# Patient Record
Sex: FEMALE | Race: WHITE | Employment: FULL TIME | URBAN - METROPOLITAN AREA
[De-identification: names, ages, dates, MRNs, and addresses within clinical notes are randomized per-mention and may not be internally consistent; named-entity substitution may affect disease eponyms.]

---

## 2017-09-07 ENCOUNTER — HOSPITAL ENCOUNTER (OUTPATIENT)
Dept: LAB | Age: 27
Discharge: HOME | End: 2017-09-07
Payer: COMMERCIAL

## 2017-09-07 PROCEDURE — 88175 CYTOPATH C/V AUTO FLUID REDO: CPT | Performed by: ADVANCED PRACTICE MIDWIFE

## 2018-11-21 ENCOUNTER — HOSPITAL ENCOUNTER (OUTPATIENT)
Dept: LAB | Age: 28
Discharge: HOME | End: 2018-11-21
Payer: COMMERCIAL

## 2018-11-21 PROCEDURE — 88175 CYTOPATH C/V AUTO FLUID REDO: CPT

## 2018-11-21 PROCEDURE — 87624 HPV HI-RISK TYP POOLED RSLT: CPT

## 2019-02-15 LAB
CHLAMYDIA, EXTERNAL: NORMAL
HBSAG, EXTERNAL: NEGATIVE
HEPATITIS C AB,   EXT: NEGATIVE
HIV, EXTERNAL: NORMAL
N. GONORRHEA, EXTERNAL: NEGATIVE
RPR, EXTERNAL: NEGATIVE
RUBELLA, EXTERNAL: NORMAL

## 2019-08-28 LAB — GRBS, EXTERNAL: NEGATIVE

## 2019-09-09 ENCOUNTER — HOSPITAL ENCOUNTER (INPATIENT)
Age: 29
LOS: 2 days | Discharge: HOME | End: 2019-09-11
Attending: OBSTETRICS & GYNECOLOGY | Admitting: OBSTETRICS & GYNECOLOGY
Payer: COMMERCIAL

## 2019-09-09 PROBLEM — D68.51 FACTOR V LEIDEN (HCC): Status: ACTIVE | Noted: 2019-09-09

## 2019-09-09 LAB
ABO + RH BLD: NORMAL
BASOPHILS # BLD: 0 K/UL (ref 0–0.1)
BASOPHILS NFR BLD: 0 % (ref 0–2)
BLOOD GROUP ANTIBODIES SERPL: NORMAL
DIFFERENTIAL METHOD BLD: ABNORMAL
EOSINOPHIL # BLD: 0 K/UL (ref 0–0.4)
EOSINOPHIL NFR BLD: 0 % (ref 0–5)
ERYTHROCYTE [DISTWIDTH] IN BLOOD BY AUTOMATED COUNT: 12.9 % (ref 11.6–14.5)
HCT VFR BLD AUTO: 40.6 % (ref 35–45)
HGB BLD-MCNC: 13.9 G/DL (ref 12–16)
LYMPHOCYTES # BLD: 1.7 K/UL (ref 0.9–3.6)
LYMPHOCYTES NFR BLD: 9 % (ref 21–52)
MCH RBC QN AUTO: 32.2 PG (ref 24–34)
MCHC RBC AUTO-ENTMCNC: 34.2 G/DL (ref 31–37)
MCV RBC AUTO: 94 FL (ref 74–97)
MONOCYTES # BLD: 1.2 K/UL (ref 0.05–1.2)
MONOCYTES NFR BLD: 6 % (ref 3–10)
NEUTS SEG # BLD: 16.1 K/UL (ref 1.8–8)
NEUTS SEG NFR BLD: 85 % (ref 40–73)
PLATELET # BLD AUTO: 231 K/UL (ref 135–420)
PMV BLD AUTO: 9.5 FL (ref 9.2–11.8)
RBC # BLD AUTO: 4.32 M/UL (ref 4.2–5.3)
SPECIMEN EXP DATE BLD: NORMAL
WBC # BLD AUTO: 19 K/UL (ref 4.6–13.2)

## 2019-09-09 PROCEDURE — 86900 BLOOD TYPING SEROLOGIC ABO: CPT

## 2019-09-09 PROCEDURE — 4A1HXCZ MONITORING OF PRODUCTS OF CONCEPTION, CARDIAC RATE, EXTERNAL APPROACH: ICD-10-PCS | Performed by: OBSTETRICS & GYNECOLOGY

## 2019-09-09 PROCEDURE — 75410000003 HC RECOV DEL/VAG/CSECN EA 0.5 HR

## 2019-09-09 PROCEDURE — 75410000002 HC LABOR FEE PER 1 HR

## 2019-09-09 PROCEDURE — 75410000000 HC DELIVERY VAGINAL/SINGLE

## 2019-09-09 PROCEDURE — 65270000029 HC RM PRIVATE

## 2019-09-09 PROCEDURE — 59025 FETAL NON-STRESS TEST: CPT

## 2019-09-09 PROCEDURE — 99282 EMERGENCY DEPT VISIT SF MDM: CPT

## 2019-09-09 PROCEDURE — 74011250637 HC RX REV CODE- 250/637: Performed by: ADVANCED PRACTICE MIDWIFE

## 2019-09-09 PROCEDURE — 10907ZC DRAINAGE OF AMNIOTIC FLUID, THERAPEUTIC FROM PRODUCTS OF CONCEPTION, VIA NATURAL OR ARTIFICIAL OPENING: ICD-10-PCS | Performed by: OBSTETRICS & GYNECOLOGY

## 2019-09-09 PROCEDURE — 85025 COMPLETE CBC W/AUTO DIFF WBC: CPT

## 2019-09-09 RX ORDER — ZOLPIDEM TARTRATE 5 MG/1
5 TABLET ORAL
Status: DISCONTINUED | OUTPATIENT
Start: 2019-09-09 | End: 2019-09-11 | Stop reason: HOSPADM

## 2019-09-09 RX ORDER — METHYLERGONOVINE MALEATE 0.2 MG/ML
0.2 INJECTION INTRAVENOUS AS NEEDED
Status: DISCONTINUED | OUTPATIENT
Start: 2019-09-09 | End: 2019-09-09

## 2019-09-09 RX ORDER — OXYTOCIN/RINGER'S LACTATE 20/1000 ML
999 PLASTIC BAG, INJECTION (ML) INTRAVENOUS ONCE
Status: DISCONTINUED | OUTPATIENT
Start: 2019-09-09 | End: 2019-09-09

## 2019-09-09 RX ORDER — HYDROMORPHONE HYDROCHLORIDE 1 MG/ML
1 INJECTION, SOLUTION INTRAMUSCULAR; INTRAVENOUS; SUBCUTANEOUS
Status: DISCONTINUED | OUTPATIENT
Start: 2019-09-09 | End: 2019-09-09

## 2019-09-09 RX ORDER — ACETAMINOPHEN 325 MG/1
650 TABLET ORAL
Status: DISCONTINUED | OUTPATIENT
Start: 2019-09-09 | End: 2019-09-11 | Stop reason: HOSPADM

## 2019-09-09 RX ORDER — LIDOCAINE HYDROCHLORIDE 10 MG/ML
20 INJECTION, SOLUTION EPIDURAL; INFILTRATION; INTRACAUDAL; PERINEURAL AS NEEDED
Status: DISCONTINUED | OUTPATIENT
Start: 2019-09-09 | End: 2019-09-09

## 2019-09-09 RX ORDER — AMOXICILLIN 250 MG
1 CAPSULE ORAL
Status: DISCONTINUED | OUTPATIENT
Start: 2019-09-09 | End: 2019-09-11 | Stop reason: HOSPADM

## 2019-09-09 RX ORDER — TERBUTALINE SULFATE 1 MG/ML
0.25 INJECTION SUBCUTANEOUS
Status: DISCONTINUED | OUTPATIENT
Start: 2019-09-09 | End: 2019-09-09

## 2019-09-09 RX ORDER — BUTORPHANOL TARTRATE 1 MG/ML
2 INJECTION INTRAMUSCULAR; INTRAVENOUS
Status: DISCONTINUED | OUTPATIENT
Start: 2019-09-09 | End: 2019-09-09

## 2019-09-09 RX ORDER — MISOPROSTOL 200 UG/1
800 TABLET ORAL
Status: DISCONTINUED | OUTPATIENT
Start: 2019-09-09 | End: 2019-09-09

## 2019-09-09 RX ORDER — SODIUM CHLORIDE, SODIUM LACTATE, POTASSIUM CHLORIDE, CALCIUM CHLORIDE 600; 310; 30; 20 MG/100ML; MG/100ML; MG/100ML; MG/100ML
125 INJECTION, SOLUTION INTRAVENOUS CONTINUOUS
Status: DISCONTINUED | OUTPATIENT
Start: 2019-09-09 | End: 2019-09-09

## 2019-09-09 RX ORDER — SALICYLIC ACID
90 POWDER (GRAM) MISCELLANEOUS ONCE
Status: DISCONTINUED | OUTPATIENT
Start: 2019-09-09 | End: 2019-09-09

## 2019-09-09 RX ORDER — NALBUPHINE HYDROCHLORIDE 10 MG/ML
10 INJECTION, SOLUTION INTRAMUSCULAR; INTRAVENOUS; SUBCUTANEOUS
Status: DISCONTINUED | OUTPATIENT
Start: 2019-09-09 | End: 2019-09-09

## 2019-09-09 RX ORDER — IBUPROFEN 400 MG/1
800 TABLET ORAL
Status: DISCONTINUED | OUTPATIENT
Start: 2019-09-09 | End: 2019-09-11 | Stop reason: HOSPADM

## 2019-09-09 RX ORDER — SALICYLIC ACID
90 POWDER (GRAM) MISCELLANEOUS ONCE
Status: COMPLETED | OUTPATIENT
Start: 2019-09-09 | End: 2019-09-09

## 2019-09-09 RX ORDER — OXYTOCIN/RINGER'S LACTATE 20/1000 ML
125 PLASTIC BAG, INJECTION (ML) INTRAVENOUS CONTINUOUS
Status: DISCONTINUED | OUTPATIENT
Start: 2019-09-09 | End: 2019-09-09

## 2019-09-09 RX ORDER — HYDROCORTISONE 25 MG/G
CREAM TOPICAL AS NEEDED
Status: DISCONTINUED | OUTPATIENT
Start: 2019-09-09 | End: 2019-09-11 | Stop reason: HOSPADM

## 2019-09-09 RX ORDER — PROMETHAZINE HYDROCHLORIDE 25 MG/ML
25 INJECTION, SOLUTION INTRAMUSCULAR; INTRAVENOUS
Status: DISCONTINUED | OUTPATIENT
Start: 2019-09-09 | End: 2019-09-11 | Stop reason: HOSPADM

## 2019-09-09 RX ORDER — ENOXAPARIN SODIUM 100 MG/ML
40 INJECTION SUBCUTANEOUS EVERY 24 HOURS
Status: DISCONTINUED | OUTPATIENT
Start: 2019-09-10 | End: 2019-09-11 | Stop reason: HOSPADM

## 2019-09-09 RX ORDER — OXYTOCIN 10 [USP'U]/ML
INJECTION, SOLUTION INTRAMUSCULAR; INTRAVENOUS
Status: DISCONTINUED
Start: 2019-09-09 | End: 2019-09-09 | Stop reason: WASHOUT

## 2019-09-09 RX ADMIN — IBUPROFEN 800 MG: 400 TABLET, FILM COATED ORAL at 20:57

## 2019-09-09 RX ADMIN — CASTOR OIL 60 ML: 1 LIQUID ORAL at 19:10

## 2019-09-09 NOTE — L&D DELIVERY NOTE
Delivery Summary     Blanchard Councilman had urge ot push in shower. Assisted to bed. SVE was 9cms with BBOW. Consented to AROM- clear fluid; 5 mins later was 10 cms and pushed well   of VFi over small 1st degree lac that was hemostatic. No repair. Baby to mom's chest with spont cry and pink color. apgar 8/9. Cord double clamped , then cut by dad after placental transfusion complete. Placenta delivered spont intact with 3 CV, EBl 250cc. Dr Mary Moreland  informed of status. Elsa        Patient: Luiz Cage MRN: [id number]  SSN: [id number]    YOB: 1990  Age: 30 y.o. Sex: female       Information for the patient's :  Gregory Roach [[id number]]       Labor Events:    Labor: No    Steroids: None   Cervical Ripening Date/Time:       Cervical Ripening Type: None   Antibiotics During Labor: No   Rupture Identifier:      Rupture Date/Time: 2019 6:50 PM   Rupture Type: AROM   Amniotic Fluid Volume:  Moderate    Amniotic Fluid Description: Clear    Amniotic Fluid Odor:      Induction: None       Induction Date/Time:        Indications for Induction:      Augmentation: AROM   Augmentation Date/Time: 20196:50 PM   Indications for Augmentation:     Labor complications: None       Additional complications:        Delivery Events:  Indications For Episiotomy:     Episiotomy: None   Perineal Laceration(s): None   Repaired:     Periurethral Laceration Location:      Repaired:     Labial Laceration Location:     Repaired:     Sulcal Laceration Location:     Repaired:     Vaginal Laceration Location:     Repaired:     Cervical Laceration Location:     Repaired:     Repair Suture: None   Number of Repair Packets:     Estimated Blood Loss (ml): 250ml     Delivery Date: 2019    Delivery Time: 7:03 PM  Delivery Type: Vaginal, Spontaneous  Sex:  Female    Gestational Age: 36w4d   Delivery Clinician:  Sridhar Marinelli  Living Status: Living   Delivery Location: L&D 3418          APGARS  One minute Five minutes Ten minutes   Skin color:            Heart rate:            Grimace:            Muscle tone:            Breathing: Totals:                Presentation: Vertex    Position:        Resuscitation Method:        Meconium Stained:        Cord Information: 3 Vessels  Complications: None  Cord around:    Delayed cord clamping? Yes  Cord clamped date/time:2019  7:12 PM  Disposition of Cord Blood: Discard    Blood Gases Sent?: No    Placenta:  Date/Time: 2019  7:12 PM  Removal: Spontaneous      Appearance: Normal;Intact     [place] Measurements:  Birth Weight:        Birth Length:        Head Circumference:        Chest Circumference:       Abdominal Girth: Other Providers:   TICO Lockwood;;;;;;;Jose PAUL, Obstetrician;Primary Nurse;Primary [place] Nurse;Nicu Nurse;Neonatologist;Anesthesiologist;Crna;Nurse Practitioner;Midwife;Nursery Nurse           Group B Strep:   Lab Results   Component Value Date/Time    GrBStrep, External Negative 2019     Information for the patient's :  BG Jamir Richter [[id number]]   No results found for: ABORH, PCTABR, PCTDIG, BILI, ABORHEXT, ABORH    No results for input(s): PCO2CB, PO2CB, HCO3I, SO2I, IBD, PTEMPI, SPECTI, PHICB, ISITE, IDEV, IALLEN in the last 72 hours.

## 2019-09-09 NOTE — H&P
Obstetric Admission History and Physical    Name: Octavia Casas MRN: [id number] [id number]: [id number]    YOB: 1990  Age: 30 y.o. Sex: female       Subjective:      Chief complaint:    Chief Complaint   Patient presents with   Olvinmark Davidson is a 30 y.o.  female, G 2 P 1 who presents at 38.2 weeks gestation with c/o contractions since 1600 today. On admission EFM strip is obtained and is Category 1.     OB HISTORY  Prenatal care started at 8 wks with [address]. TVS supporting dates and viability. Problems of pregnancy include heterozygous factor V leiden, no PE, on baby ASA, only needs anticoags for PP. Total wt gain 29 lbs. GBS neg. PAST PREGNANCY HISTORY   39.2 12 hrsm 6-12 oz, f  none [facility]    PAST MEDICAL / SURGICAL HISTORY  No past medical history on file.   Problem List as of 2019 Date Reviewed: 2019          Codes Class Noted - Resolved    Factor V Leiden [facility]) ICD-10-CM: M46.48  ICD-9-CM: 289.81  2019 - Present        * (Principal) Labor and delivery indication for care or intervention ICD-10-CM: O75.9  ICD-9-CM: 659.90  2019 - Present        RESOLVED: Labor and delivery, indication for care ICD-10-CM: O75.9  ICD-9-CM: 659.90  2016 - 2019              FAMILY/ SOCIAL HISTORY  Social History     Socioeconomic History    Marital status:      Spouse name: Not on file    Number of children: Not on file    Years of education: Not on file    Highest education level: Not on file   Occupational History    Not on file   Social Needs    Financial resource strain: Not on file    Food insecurity:     Worry: Not on file     Inability: Not on file    Transportation needs:     Medical: Not on file     Non-medical: Not on file   Tobacco Use    Smoking status: Not on file   Substance and Sexual Activity    Alcohol use: Not on file    Drug use: Not on file    Sexual activity: Not on file   Lifestyle    Physical activity:     Days per week: Not on file     Minutes per session: Not on file    Stress: Not on file   Relationships    Social connections:     Talks on phone: Not on file     Gets together: Not on file     Attends Jain service: Not on file     Active member of club or organization: Not on file     Attends meetings of clubs or organizations: Not on file     Relationship status: Not on file    Intimate partner violence:     Fear of current or ex partner: Not on file     Emotionally abused: Not on file     Physically abused: Not on file     Forced sexual activity: Not on file   Other Topics Concern    Not on file   Social History Narrative    Not on file          ALLERGY:  Allergies   Allergen Reactions    Latex Rash       Review of Systems:  A comprehensive review of systems was negative      Objective:     VITAL SIGNS:  Visit Vitals  Temp 98.9 °F (37.2 °C)   Resp 18   SpO2 100%   Breastfeeding? Yes       Physical Exam:  Abdomen: soft  Position of baby vtx  Estimated fetal weight 7 lbs   Contractions q 4 mins/mod quality  FHR baseline at  150 bpm/ variability mod/Category 1/accels  External Genitalia: normal general appearance without lesions  Cervix: Dilation: 6cm/90/-1  Membranes  intact     Assessment:     1) 38.2 weeks Intrauterine Pregnancy . 2) labor management      Plan:     Reassuring fetal status, Labor  Progressing normally, Continue plan for vaginal delivery   desires unmed birth. Reactive NSt. Up to shower.      Dr Amanda Ordoñez MD  notified and aware of admission    Signed By:  Napoleon Ford CNM     September 9, 2019

## 2019-09-09 NOTE — PROGRESS NOTES
presents to [email or web address] for contrctions since 2am today  Patient reports she is GBS negative denies bleeding and leaking of fluid.  Patient lbsfax3m no epidural     MERLE Chavez @ bedside for vagexam      Patient off monitor to shower per  MERLE Chavez    1850 AROM    1903 Birth of a viable female infant

## 2019-09-09 NOTE — PROGRESS NOTES
1945: Bedside and Verbal shift change report given to davide garnett rn (oncoming nurse) by David Brown rn (offgoing nurse). Report included the following information SBAR, Procedure Summary, Intake/Output, MAR and Recent Results. 2110: Assist pt to restroom to void, voided and ambulated to restroom without difficulty. When on toilet, pt began to feel lightheaded and became diaphoretic and pale. Called out for assistance, pt assisted back to bed via wheelchair. VS WNL, brought large jug of ice water and sandwich tray. Pt reports feeling much improved with fluids. Will move to PP room when feeling well.    2300: This RN to room, pt resting comfortably, denies needs or concerns. Reports feeling much better after food and drink. Will transfer after baby's transition is complete. 2330: Pt assisted up to restroom to void via wheelchair, STEVEN Rivas RN and this RN at side. Transferred to and from wheelchair without difficulty, voided without difficulty. Pericare performed, ice pack and tucks pads applied, pt assisted back to wheelchair and transferred to [address] [place] [address] at [phone]. Oriented to room and care setting, assisted to bed.    2355: TRANSFER - OUT REPORT:    Verbal report given to kai beltran rn (name) on Jazmin Ruth  being transferred to [facility] (unit) for routine progression of care       Report consisted of patients Situation, Background, Assessment and   Recommendations(SBAR). Information from the following report(s) SBAR, Procedure Summary, Intake/Output, MAR and Recent Results was reviewed with the receiving nurse. Lines:       Opportunity for questions and clarification was provided.       Patient transported with:   Registered Nurse

## 2019-09-10 LAB
HCT VFR BLD AUTO: 38.3 % (ref 35–45)
HGB BLD-MCNC: 12.9 G/DL (ref 12–16)

## 2019-09-10 PROCEDURE — 74011250636 HC RX REV CODE- 250/636: Performed by: ADVANCED PRACTICE MIDWIFE

## 2019-09-10 PROCEDURE — 74011250637 HC RX REV CODE- 250/637: Performed by: ADVANCED PRACTICE MIDWIFE

## 2019-09-10 PROCEDURE — 36415 COLL VENOUS BLD VENIPUNCTURE: CPT

## 2019-09-10 PROCEDURE — 65270000029 HC RM PRIVATE

## 2019-09-10 PROCEDURE — 85018 HEMOGLOBIN: CPT

## 2019-09-10 PROCEDURE — 85014 HEMATOCRIT: CPT

## 2019-09-10 RX ADMIN — IBUPROFEN 800 MG: 400 TABLET, FILM COATED ORAL at 14:10

## 2019-09-10 RX ADMIN — ENOXAPARIN SODIUM 40 MG: 40 INJECTION SUBCUTANEOUS at 12:30

## 2019-09-10 RX ADMIN — IBUPROFEN 800 MG: 400 TABLET, FILM COATED ORAL at 06:15

## 2019-09-10 RX ADMIN — ACETAMINOPHEN 650 MG: 325 TABLET, FILM COATED ORAL at 02:31

## 2019-09-10 RX ADMIN — IBUPROFEN 800 MG: 400 TABLET, FILM COATED ORAL at 23:07

## 2019-09-10 RX ADMIN — ACETAMINOPHEN 650 MG: 325 TABLET, FILM COATED ORAL at 20:05

## 2019-09-10 NOTE — ROUTINE PROCESS
TRANSFER - IN REPORT:    Verbal report received from Christopher Keating RN(name) on Glynis Cushing  being received from L&D(unit) for routine progression of care      Report consisted of patients Situation, Background, Assessment and   Recommendations(SBAR). Information from the following report(s) SBAR, Kardex, Intake/Output, MAR and Recent Results was reviewed with the receiving nurse. Opportunity for questions and clarification was provided. Assessment completed upon patients arrival to unit and care assumed.

## 2019-09-10 NOTE — LACTATION NOTE
Mother breast fed her first baby. Mom states this baby has latched and nursed well and baby is not yet 25 hours old. Experienced mother. Reviewed latch, positioning, colostrum, diapers. General discussion. Gave BF information, daily log and resource guide. Offered assistance if needed.

## 2019-09-10 NOTE — PROGRESS NOTES
PPD # 1    Patient doing well post-partum without significant complaint. Voiding without difficulty, normal lochia. Breastfeeding well. Baby stable. Vitals:    Patient Vitals for the past 8 hrs:   BP Temp Pulse Resp SpO2   09/10/19 0431 96/55 98.3 °F (36.8 °C) 62 16 94 %     Temp (24hrs), Av.4 °F (36.9 °C), Min:97.7 °F (36.5 °C), Max:98.9 °F (37.2 °C)      Vital signs stable, afebrile. Exam:  Patient without distress. Breasts intact and nontender               Abdomen soft, fundus firm at level of umbilicus, nontender               Perineum with normal lochia noted. Lower extremities are negative for swelling, cords or tenderness. Lab/Data Review:  Lab Results   Component Value Date/Time    WBC 19.0 (H) 2019 05:55 PM    HGB 12.9 09/10/2019 06:32 AM    HCT 38.3 09/10/2019 06:32 AM    PLATELET 915  05:55 PM    MCV 94.0 2019 05:55 PM    Hgb, External 41.0 04/15/2016 05:44 PM    Hct, External 14.1 04/15/2016 05:44 PM    Platelet cnt., External 249 04/15/2016 05:44 PM       Assessment and Plan:  Patient appears to be having uncomplicated post-partum course. Continue routine perineal care and maternal education. Plan discharge tomorrow if no problems occur.     Cortez Waldrop CNM  9/10/2019  8:19 AM

## 2019-09-10 NOTE — PROGRESS NOTES
Visited with mother and acquired permission to announce baby's name.     Dylan Randolph   Spiritual Care   [phone]

## 2019-09-10 NOTE — PROGRESS NOTES
Bedside and Verbal shift change report given to Mayda Shanks RN (oncoming nurse) by Ruddy Aviles RN (offgoing nurse). Report included the following information SBAR, Kardex, Procedure Summary, Intake/Output, MAR, Accordion and Recent Results.

## 2019-09-11 VITALS
SYSTOLIC BLOOD PRESSURE: 97 MMHG | WEIGHT: 134 LBS | OXYGEN SATURATION: 97 % | HEART RATE: 75 BPM | HEIGHT: 63 IN | BODY MASS INDEX: 23.74 KG/M2 | TEMPERATURE: 98.7 F | RESPIRATION RATE: 18 BRPM | DIASTOLIC BLOOD PRESSURE: 56 MMHG

## 2019-09-11 PROCEDURE — 74011000250 HC RX REV CODE- 250: Performed by: OBSTETRICS & GYNECOLOGY

## 2019-09-11 PROCEDURE — 74011250636 HC RX REV CODE- 250/636: Performed by: ADVANCED PRACTICE MIDWIFE

## 2019-09-11 PROCEDURE — 74011250637 HC RX REV CODE- 250/637: Performed by: ADVANCED PRACTICE MIDWIFE

## 2019-09-11 RX ORDER — LIDOCAINE AND PRILOCAINE 25; 25 MG/G; MG/G
CREAM TOPICAL AS NEEDED
Status: DISCONTINUED | OUTPATIENT
Start: 2019-09-11 | End: 2019-09-11 | Stop reason: HOSPADM

## 2019-09-11 RX ORDER — LIDOCAINE AND PRILOCAINE 25; 25 MG/G; MG/G
0.5 CREAM TOPICAL
Qty: 1 KIT | Refills: 1 | Status: SHIPPED | OUTPATIENT
Start: 2019-09-11 | End: 2019-09-11

## 2019-09-11 RX ORDER — ENOXAPARIN SODIUM 100 MG/ML
40 INJECTION SUBCUTANEOUS EVERY 24 HOURS
Qty: 30 SYRINGE | Refills: 1 | Status: SHIPPED | OUTPATIENT
Start: 2019-09-11

## 2019-09-11 RX ADMIN — ACETAMINOPHEN 650 MG: 325 TABLET, FILM COATED ORAL at 06:10

## 2019-09-11 RX ADMIN — IBUPROFEN 800 MG: 400 TABLET, FILM COATED ORAL at 09:18

## 2019-09-11 RX ADMIN — ENOXAPARIN SODIUM 40 MG: 40 INJECTION SUBCUTANEOUS at 11:00

## 2019-09-11 RX ADMIN — LIDOCAINE AND PRILOCAINE: 25; 25 CREAM TOPICAL at 10:33

## 2019-09-11 NOTE — PROGRESS NOTES
0720- Bedside and Verbal shift change report given to FARIDA Wilkerson rn (oncoming nurse) by SHANEKA Marks rn (offgoing nurse). Report included the following information SBAR, Kardex, Procedure Summary, Intake/Output, MAR and Recent Results. Patient aware of hourly rounding and not waking patient if sleeping. 0820- Assessment completed at this time. Sitting up in bed. Going to eat breakfast.   0947- Patient up to bathroom and had clot. Small clot noted. Educated on clots and sitting in bed. No other questions. 1120- Patient is very upset over lovenox shot. States it stings so bad even with emla cream. Patient is very teary eyed. Family is supportive and questioning the need for therapy. Will discuss with midwife before they discharge. Patient is reassured by this. 1135- JASPAL Barriga at bedside. 1200- Discharge instructions reviewed with patient. Understanding verbalized of all instructions given. Time given for questions, all questions answered. Electronic signature obtained. Specific instructions given on lovenox shot and areas that it can be injected in .  will be giving shots and watched this rn give the lovenox shot. See all discharge instructions given for full overview of all education given. 200- Mother taken to car in wheelchair in stable condition holding baby in car seat.

## 2019-09-11 NOTE — DISCHARGE SUMMARY
Obstetrical Discharge Summary       ELIZABETH Medina MD  [address]  [address]  [address]  [address]  [phone]        Patient ID:Esperanza GLEZ,[id number],40 y.o.,1990    Postpartum Day:    Information for the patient's :  Jw Brewster [[id number]]   3 days       Admit Date: 2019    Discharge Date: 2019     Admitting Physician: Abril Jenkins MD     Admission Diagnoses: Labor and delivery indication for care or intervention [O75.9]  Labor and delivery indication for care or intervention [O75.9]    Discharge Diagnoses: same as above      Additional Diagnoses:Present on Admission:   (Resolved) Labor and delivery indication for care or intervention   Factor V Leiden [facility])       Procedure:        Baby link  Information for the patient's :  Jw Brewster [[id number]]     Delivery Type: Vaginal, Spontaneous   Delivery Date: 2019   Delivery Time: 7:03 PM     Birth Weight: 2.78 kg     Sex:  female  Delivery Clinician:  Ceil Siemens   Gestational Age: 36w4d    Presentation: Vertex   Position:             Apgars were   and       Resuscitation Method:       Meconium Stained:      Living Status: Living       Placenta Date/Time: 2019  7:12 PM   Placenta Removal: Spontaneous   Placenta Appearance: Normal;Intact    Cord Information: 3 Vessels    Cord Events: None       Disposition of Cord Blood: Discard    Blood Gases Sent?:  No         Baby procedures:    Information for the patient's :  Jw Brewster [[id number]]          Feeding Method: Infant Feeding: Breast Milk    Immunizations:    Immunization History   Administered Date(s) Administered    DTaP 10/16/2016    Influenza Vaccine 2016        Immunizations:    Immunization History   Administered Date(s) Administered    DTaP 10/16/2016    Influenza Vaccine 2016       Group Beta Strep:   GrBStrep, External   Date Value Ref Range Status   2019 Negative  Final WBC   Date/Time Value Ref Range Status   2019 05:55 PM 19.0 (H) 4.6 - 13.2 K/uL Final   2016 06:57 AM 17.3 (H) 4.6 - 13.2 K/uL Final   2016 05:00 AM 26.6 (H) 4.6 - 13.2 K/uL Final     HGB   Date/Time Value Ref Range Status   09/10/2019 06:32 AM 12.9 12.0 - 16.0 g/dL Final   2019 05:55 PM 13.9 12.0 - 16.0 g/dL Final   2016 06:57 AM 10.9 (L) 12.0 - 16.0 g/dL Final     PLATELET   Date/Time Value Ref Range Status   2019 05:55  135 - 420 K/uL Final     Hgb, External   Date/Time Value Ref Range Status   04/15/2016 05:44 PM 41.0  Final     Platelet cnt., External   Date/Time Value Ref Range Status   04/15/2016 05:44   Final         Hospital Course: Unremarkable  Subjective:       Ceci Centeno is doing well. Baby is breastfeeding. Community resources for breastfeeding help discussed per [facility] query about help if needed. Ceci Centeno would like something to numb the area for her lovenox injections. Discussed EMLA cream.  If this is not helpful, Ceci Centeno can call the office for the patches her friend has for IVF.       Objective:    Breasts Nontender and intact  Fundus firm and involuting  Lochia rubra, minimal  Legs without cords, neg Homans, minimal to no edema    Lab/Data Review:  Patient Vitals for the past 8 hrs:   BP Temp Pulse Resp SpO2   19 0343 106/49 98.4 °F (36.9 °C) 67 15 99 %       Temp (24hrs), Av.2 °F (36.8 °C), Min:97.9 °F (36.6 °C), Max:98.4 °F (36.9 °C)      WBC   Date/Time Value Ref Range Status   2019 05:55 PM 19.0 (H) 4.6 - 13.2 K/uL Final   2016 06:57 AM 17.3 (H) 4.6 - 13.2 K/uL Final   2016 05:00 AM 26.6 (H) 4.6 - 13.2 K/uL Final     HGB   Date/Time Value Ref Range Status   09/10/2019 06:32 AM 12.9 12.0 - 16.0 g/dL Final   2019 05:55 PM 13.9 12.0 - 16.0 g/dL Final   2016 06:57 AM 10.9 (L) 12.0 - 16.0 g/dL Final     PLATELET   Date/Time Value Ref Range Status   2019 05:55  135 - 420 K/uL Final     Hgb, External Date/Time Value Ref Range Status   04/15/2016 05:44 PM 41.0  Final     Platelet cnt., External   Date/Time Value Ref Range Status   04/15/2016 05:44   Final     Patient Instructions:   Current Discharge Medication List      START taking these medications    Details   enoxaparin (LOVENOX) 40 mg/0.4 mL 0.4 mL by SubCUTAneous route every twenty-four (24) hours. Indications: factor V leiden heterozygous  Qty: 30 Syringe, Refills: 1      lidocaine-prilocaine (EMLA) cream Apply 0.5 Each to affected area now for 1 dose. Qty: 1 Kit, Refills: 1         CONTINUE these medications which have NOT CHANGED    Details   PNV95/FERROUS FUMARATE/FA (PRENATAL PO) Take  by mouth. Assessment:     Status post: postpartum vaginal delivery   Recovering well  Breastfeeding  Factor V Leiden with Lovenox      Plan:     Postpartum care discussed including diet, ambulation, and actvitiy restrictions. Continue Lovenox as previously planned. Discharge instructions and questions answered for vaginal delivery.   Follow in 6 wks or prn      Signed By: Zhou Gloria CNM     September 11, 2019

## 2019-09-11 NOTE — DISCHARGE INSTRUCTIONS

## 2019-09-11 NOTE — ROUTINE PROCESS
Bedside and Verbal shift change report given to Zeenat Presley RN (oncoming nurse) by Gian Perry RN (offgoing nurse). Report included the following information SBAR, Kardex, Intake/Output, MAR and Recent Results.

## 2019-09-11 NOTE — LACTATION NOTE
Mother states baby is continuing to nurse well. No questions/concerns. Offered assistance if needed.

## 2019-09-11 NOTE — PROGRESS NOTES
Notified by bedside nurse that pt is upset, tearful and very anxious about the Lovenox injection. In room to find pt red-faced and crying. She is asking if there is any alternative to the Lovenox injections - didn't have to do this after her last baby. Reviewed recommendation has been discussed with her since her very first OB appt. Discussed changes in protocols based on continuously-emerging data. She is very worried about her ability to breastfeed if she takes these injections. She clarifies at my request: everything is sore, the injections sting and she is overwhelmed with discomfort which will get worse when she is engorged and feels will impact her ability to breastfeed. She is sobbing at this time. Long discussion about the impact of her current state on PP recovery. Does not seem worth going through all of this daily - and unlikely she will - for the small chance of complication. Advise her that the blood clot risk PP is real and that pulmonary embolus can be life-threatening and the possibility of stroke exists. Addressed her query about taking Baby ASA daily, while not the recommendation, would be better than nothing.  and mother both gave input. Rose Hernandez will try again today and see how it goes.   to ice the area prior to administration

## 2019-09-11 NOTE — PROGRESS NOTES
Went back to discuss with pt and spouse the findings from UpToDate of increased chance of VTE based on history of first-degree relative (mother) having had DVT.  In light of this information, Enriqueta Min says she will be brave and continue with daily injections

## 2019-09-20 ENCOUNTER — HOSPITAL ENCOUNTER (EMERGENCY)
Age: 29
Discharge: HOME | End: 2019-09-20
Attending: EMERGENCY MEDICINE
Payer: COMMERCIAL

## 2019-09-20 ENCOUNTER — APPOINTMENT (OUTPATIENT)
Dept: VASCULAR SURGERY | Age: 29
End: 2019-09-20
Attending: PHYSICIAN ASSISTANT
Payer: COMMERCIAL

## 2019-09-20 VITALS
TEMPERATURE: 99.2 F | DIASTOLIC BLOOD PRESSURE: 87 MMHG | WEIGHT: 122 LBS | HEART RATE: 76 BPM | HEIGHT: 62 IN | BODY MASS INDEX: 22.45 KG/M2 | SYSTOLIC BLOOD PRESSURE: 123 MMHG | RESPIRATION RATE: 15 BRPM | OXYGEN SATURATION: 97 %

## 2019-09-20 DIAGNOSIS — S76.319A HAMSTRING STRAIN, INITIAL ENCOUNTER: Primary | ICD-10-CM

## 2019-09-20 LAB
ANION GAP SERPL CALC-SCNC: 8 MMOL/L (ref 3–18)
BASOPHILS # BLD: 0.1 K/UL (ref 0–0.1)
BASOPHILS NFR BLD: 0 % (ref 0–2)
BUN SERPL-MCNC: 16 MG/DL (ref 7–18)
BUN/CREAT SERPL: 26 (ref 12–20)
CALCIUM SERPL-MCNC: 9.4 MG/DL (ref 8.5–10.1)
CHLORIDE SERPL-SCNC: 105 MMOL/L (ref 100–111)
CO2 SERPL-SCNC: 26 MMOL/L (ref 21–32)
CREAT SERPL-MCNC: 0.62 MG/DL (ref 0.6–1.3)
DIFFERENTIAL METHOD BLD: ABNORMAL
EOSINOPHIL # BLD: 0.2 K/UL (ref 0–0.4)
EOSINOPHIL NFR BLD: 1 % (ref 0–5)
ERYTHROCYTE [DISTWIDTH] IN BLOOD BY AUTOMATED COUNT: 12.6 % (ref 11.6–14.5)
GLUCOSE SERPL-MCNC: 75 MG/DL (ref 74–99)
HCT VFR BLD AUTO: 46.2 % (ref 35–45)
HGB BLD-MCNC: 15.8 G/DL (ref 12–16)
INR PPP: 0.9 (ref 0.8–1.2)
LYMPHOCYTES # BLD: 2.6 K/UL (ref 0.9–3.6)
LYMPHOCYTES NFR BLD: 22 % (ref 21–52)
MCH RBC QN AUTO: 32.6 PG (ref 24–34)
MCHC RBC AUTO-ENTMCNC: 34.2 G/DL (ref 31–37)
MCV RBC AUTO: 95.3 FL (ref 74–97)
MONOCYTES # BLD: 0.7 K/UL (ref 0.05–1.2)
MONOCYTES NFR BLD: 6 % (ref 3–10)
NEUTS SEG # BLD: 8.4 K/UL (ref 1.8–8)
NEUTS SEG NFR BLD: 71 % (ref 40–73)
PLATELET # BLD AUTO: 303 K/UL (ref 135–420)
PMV BLD AUTO: 8.7 FL (ref 9.2–11.8)
POTASSIUM SERPL-SCNC: 3.9 MMOL/L (ref 3.5–5.5)
PROTHROMBIN TIME: 11.5 SEC (ref 11.5–15.2)
RBC # BLD AUTO: 4.85 M/UL (ref 4.2–5.3)
SODIUM SERPL-SCNC: 139 MMOL/L (ref 136–145)
WBC # BLD AUTO: 11.9 K/UL (ref 4.6–13.2)

## 2019-09-20 PROCEDURE — 85610 PROTHROMBIN TIME: CPT

## 2019-09-20 PROCEDURE — 85025 COMPLETE CBC W/AUTO DIFF WBC: CPT

## 2019-09-20 PROCEDURE — 80048 BASIC METABOLIC PNL TOTAL CA: CPT

## 2019-09-20 PROCEDURE — 99281 EMR DPT VST MAYX REQ PHY/QHP: CPT

## 2019-09-20 PROCEDURE — 93971 EXTREMITY STUDY: CPT

## 2019-09-20 RX ORDER — ACETAMINOPHEN 325 MG/1
650 TABLET ORAL
Qty: 20 TAB | Refills: 0 | Status: SHIPPED | OUTPATIENT
Start: 2019-09-20

## 2019-09-20 NOTE — ED PROVIDER NOTES
EMERGENCY DEPARTMENT HISTORY AND PHYSICAL EXAM    Date: 9/20/2019  Patient Name: Abeba Clemons    History of Presenting Illness     Chief Complaint   Patient presents with    Leg Pain    Post-Partum Complications             History Provided By: Patient    Chief Complaint:   Chief Complaint   Patient presents with    Leg Pain    Post-Partum Complications     Duration: 1 Days  Timing:  Acute  Location: right thigh  Quality: Aching  Severity: 5 out of 10  Modifying Factors: childbirth  Associated Symptoms: denies any other associated signs or symptoms      Additional History (Context): Abeba Clemons is a 30 y.o. female with Factor 5 who presents with Right thigh pain. She is taking Lovenox due to her factor V Leiden insufficiency and is 2 days post-partum. Her OB told her to come in to r/o a DVT. PCP: Delores Padgett MD    Current Outpatient Medications   Medication Sig Dispense Refill    acetaminophen (TYLENOL) 325 mg tablet Take 2 Tabs by mouth every four (4) hours as needed for Pain. 20 Tab 0    enoxaparin (LOVENOX) 40 mg/0.4 mL 0.4 mL by SubCUTAneous route every twenty-four (24) hours. Indications: factor V leiden heterozygous 30 Syringe 1    PNV95/FERROUS FUMARATE/FA (PRENATAL PO) Take  by mouth. Past History     Past Medical History:  Past Medical History:   Diagnosis Date    Disease of blood and blood forming organ        Past Surgical History:  History reviewed. No pertinent surgical history. Family History:  History reviewed. No pertinent family history. Social History:  Social History     Tobacco Use    Smoking status: Never Smoker    Smokeless tobacco: Never Used   Substance Use Topics    Alcohol use: Not on file    Drug use: Not on file       Allergies: Allergies   Allergen Reactions    Latex Rash         Review of Systems   Review of Systems   Constitutional: Negative. Respiratory: Negative. Cardiovascular: Negative. Gastrointestinal: Negative.   Negative for nausea and vomiting. Genitourinary: Negative. Musculoskeletal: Positive for myalgias. Skin: Negative. Neurological: Negative. All Other Systems Negative  Physical Exam     Vitals:    09/20/19 1721   BP: 123/87   Pulse: 76   Resp: 15   Temp: 99.2 °F (37.3 °C)   SpO2: 97%   Weight: 55.3 kg (122 lb)   Height: 5' 2\" (1.575 m)     Physical Exam   Constitutional: She appears well-developed and well-nourished. HENT:   Head: Normocephalic and atraumatic. Nose: Nose normal.   Eyes: Conjunctivae and EOM are normal.   Neck: Normal range of motion. Neck supple. Cardiovascular: Normal rate, regular rhythm, normal heart sounds and intact distal pulses. Pulmonary/Chest: Effort normal and breath sounds normal.   Musculoskeletal: Normal range of motion. She exhibits tenderness. Right upper leg: She exhibits tenderness. She exhibits no bony tenderness, no swelling, no edema and no deformity. Neurological: She is alert. She has normal reflexes. No cranial nerve deficit. Skin: Skin is warm and dry. Psychiatric: She has a normal mood and affect. Her behavior is normal.   Nursing note and vitals reviewed. Diagnostic Study Results     Labs -     Recent Results (from the past 12 hour(s))   PROTHROMBIN TIME + INR    Collection Time: 09/20/19  5:00 PM   Result Value Ref Range    Prothrombin time 11.5 11.5 - 15.2 sec    INR 0.9 0.8 - 1.2     CBC WITH AUTOMATED DIFF    Collection Time: 09/20/19  5:00 PM   Result Value Ref Range    WBC 11.9 4.6 - 13.2 K/uL    RBC 4.85 4.20 - 5.30 M/uL    HGB 15.8 12.0 - 16.0 g/dL    HCT 46.2 (H) 35.0 - 45.0 %    MCV 95.3 74.0 - 97.0 FL    MCH 32.6 24.0 - 34.0 PG    MCHC 34.2 31.0 - 37.0 g/dL    RDW 12.6 11.6 - 14.5 %    PLATELET 586 266 - 298 K/uL    MPV 8.7 (L) 9.2 - 11.8 FL    NEUTROPHILS 71 40 - 73 %    LYMPHOCYTES 22 21 - 52 %    MONOCYTES 6 3 - 10 %    EOSINOPHILS 1 0 - 5 %    BASOPHILS 0 0 - 2 %    ABS. NEUTROPHILS 8.4 (H) 1.8 - 8.0 K/UL    ABS.  LYMPHOCYTES 2.6 0.9 - 3.6 K/UL    ABS. MONOCYTES 0.7 0.05 - 1.2 K/UL    ABS. EOSINOPHILS 0.2 0.0 - 0.4 K/UL    ABS. BASOPHILS 0.1 0.0 - 0.1 K/UL    DF AUTOMATED     METABOLIC PANEL, BASIC    Collection Time: 09/20/19  5:00 PM   Result Value Ref Range    Sodium 139 136 - 145 mmol/L    Potassium 3.9 3.5 - 5.5 mmol/L    Chloride 105 100 - 111 mmol/L    CO2 26 21 - 32 mmol/L    Anion gap 8 3.0 - 18 mmol/L    Glucose 75 74 - 99 mg/dL    BUN 16 7.0 - 18 MG/DL    Creatinine 0.62 0.6 - 1.3 MG/DL    BUN/Creatinine ratio 26 (H) 12 - 20      GFR est AA >60 >60 ml/min/1.73m2    GFR est non-AA >60 >60 ml/min/1.73m2    Calcium 9.4 8.5 - 10.1 MG/DL       Radiologic Studies -   No orders to display     CT Results  (Last 48 hours)    None        CXR Results  (Last 48 hours)    None            Medical Decision Making   I am the first provider for this patient. I reviewed the vital signs, available nursing notes, past medical history, past surgical history, family history and social history. Vital Signs-Reviewed the patient's vital signs. Records Reviewed: Nursing Notes      Provider Notes (Medical Decision Making): This patient is negative for DVT. I feel that this time her hamstring pain is from a hamstring strain during labor since she gave her 2 days ago and had a vaginal delivery. Her all labs all look within normal limits I did not feel like she has a blood clot at this time in addition she is not any chest pain or shortness of breath and I do not feel like she has a PE at this time. She is given return precautions should she become worse and was told to immediately return to the ER. MED RECONCILIATION:  No current facility-administered medications for this encounter. Current Outpatient Medications   Medication Sig    acetaminophen (TYLENOL) 325 mg tablet Take 2 Tabs by mouth every four (4) hours as needed for Pain.  enoxaparin (LOVENOX) 40 mg/0.4 mL 0.4 mL by SubCUTAneous route every twenty-four (24) hours. Indications: factor V leiden heterozygous    PNV95/FERROUS FUMARATE/FA (PRENATAL PO) Take  by mouth. Disposition:  Home    DISCHARGE NOTE:   Pt has been reexamined. Patient has no new complaints, changes, or physical findings. Care plan outlined and precautions discussed. Results of PVL and labs were reviewed with the patient. All medications were reviewed with the patient; will d/c home with Tylenol. All of pt's questions and concerns were addressed. Patient was instructed and agrees to follow up with PCM, as well as to return to the ED upon further deterioration. Patient is ready to go home. Follow-up Information     Follow up With Specialties Details Why [facility] EMERGENCY DEPT Emergency Medicine  If symptoms worsen [address]    Marino Sullivan MD Family Practice In 2 days  [address] [place] [address]  [phone]            Current Discharge Medication List      START taking these medications    Details   acetaminophen (TYLENOL) 325 mg tablet Take 2 Tabs by mouth every four (4) hours as needed for Pain. Qty: 20 Tab, Refills: 0                 Diagnosis     Clinical Impression:   1.  Hamstring strain, initial encounter

## 2019-09-20 NOTE — ED TRIAGE NOTES
Patient is post-partum and has been taking Lovenox. She is having thigh pain. Patient sent over by Dr. Carol Graham from [facility]. According to the Brittney, she is negative for DVT. I performed a brief evaluation, including history and physical, of the patient here in triage and I have determined that pt will need further treatment and evaluation from the main side ER physician. I have placed initial orders to help in expediting patients care.      September 20, 2019 at 5:24 PM - Mita Thurman PA-C        Visit Vitals  /87 (BP 1 Location: Right arm, BP Patient Position: At rest)   Pulse 76   Temp 99.2 °F (37.3 °C)   Resp 15   Ht 5' 2\" (1.575 m)   Wt 55.3 kg (122 lb)   SpO2 97%   BMI 22.31 kg/m²

## 2019-09-20 NOTE — DISCHARGE INSTRUCTIONS
Patient Education        Hamstring Syndrome: Care Instructions  Your Care Instructions    The hamstring muscles are the three muscles in the back of the thigh. The sciatic nerve is a large nerve that runs from the low back down the legs. Hamstring syndrome is a condition caused by pressure on this nerve. The nerve may be pinched between the hamstring muscles and the pelvic bone or by the band of tissue that connects the hamstring muscles. This condition can cause pain in the hip and buttock and sometimes numbness down the back of the leg. It may hurt to sit down or stretch the hamstrings. You may have less pain when you lie on your back. Hamstring syndrome may be the result of wear and tear to the back and hamstrings. It is most often seen in people who play sports that involve running, kicking, or jumping. Other problems can cause leg pain and numbness. To diagnose hamstring syndrome, the doctor will ask about your symptoms and your activities and examine your leg. Hamstring syndrome usually gets better in a few weeks with rest and home care. The doctor may recommend exercises to stretch and strengthen your hip muscles. If home care doesn't help, your doctor may suggest a steroid shot to help reduce pain and swelling. Follow-up care is a key part of your treatment and safety. Be sure to make and go to all appointments, and call your doctor if you are having problems. It's also a good idea to know your test results and keep a list of the medicines you take. How can you care for yourself at home? · Ask your doctor if you can take an over-the-counter pain medicine, such as acetaminophen (Tylenol), ibuprofen (Advil, Motrin), or naproxen (Aleve). Be safe with medicines. Read and follow all instructions on the label. · Put ice or a cold pack on the painful area for 10 to 20 minutes at a time. Try to do this every 1 to 2 hours for the next 3 days (when you are awake) or until the swelling goes down.  Put a thin cloth between the ice and your skin. · After 2 or 3 days, if your swelling is gone, apply heat. Put a warm water bottle, a heating pad set on low, or a warm cloth over the painful area. Do not go to sleep with a heating pad on your skin. · Avoid sitting if possible, unless it feels better than standing. · Alternate lying down with short walks. Increase your walking distance as you are able to walk without making your symptoms worse. · Don't do anything that makes your symptoms worse. Return to your usual level of activity slowly. When should you call for help? Watch closely for changes in your health, and be sure to contact your doctor if:    · You have new or worse pain.     · You have new symptoms.     · You do not get better as expected. Where can you learn more? Go to http://rhonda-cindy.info/. Enter D246 in the search box to learn more about \"Hamstring Syndrome: Care Instructions. \"  Current as of: June 26, 2019  Content Version: 12.2  © 4894-3759 [employer]. Care instructions adapted under license by [employer] (which disclaims liability or warranty for this information). If you have questions about a medical condition or this instruction, always ask your healthcare professional. [address] any warranty or liability for your use of this information.

## 2019-09-20 NOTE — ED TRIAGE NOTES
10 days post partum on lovanox for clotting disorder. Has pain behind right thigh.  Sent from [facility] office to have DVT work up